# Patient Record
Sex: MALE | Race: WHITE
[De-identification: names, ages, dates, MRNs, and addresses within clinical notes are randomized per-mention and may not be internally consistent; named-entity substitution may affect disease eponyms.]

---

## 2020-02-11 ENCOUNTER — HOSPITAL ENCOUNTER (EMERGENCY)
Dept: HOSPITAL 25 - ED | Age: 60
Discharge: HOME | End: 2020-02-11
Payer: COMMERCIAL

## 2020-02-11 VITALS — SYSTOLIC BLOOD PRESSURE: 139 MMHG | DIASTOLIC BLOOD PRESSURE: 85 MMHG

## 2020-02-11 DIAGNOSIS — R00.1: ICD-10-CM
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
## 2020-02-11 DIAGNOSIS — R55: ICD-10-CM

## 2020-02-11 DIAGNOSIS — S01.01XA: Primary | ICD-10-CM
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
## 2020-02-11 DIAGNOSIS — E11.9: ICD-10-CM
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
## 2020-02-11 DIAGNOSIS — I10: ICD-10-CM

## 2020-02-11 LAB
ANION GAP SERPL CALC-SCNC: 7 MMOL/L (ref 2–11)
BASOPHILS # BLD AUTO: 0.1 10^3/UL (ref 0–0.2)
BUN SERPL-MCNC: 22 MG/DL (ref 6–24)
BUN/CREAT SERPL: 22.4 (ref 8–20)
CALCIUM SERPL-MCNC: 9.4 MG/DL (ref 8.6–10.3)
CHLORIDE SERPL-SCNC: 103 MMOL/L (ref 101–111)
EOSINOPHIL # BLD AUTO: 0.5 10^3/UL (ref 0–0.6)
GLUCOSE SERPL-MCNC: 163 MG/DL (ref 70–100)
HCO3 SERPL-SCNC: 28 MMOL/L (ref 22–32)
HCT VFR BLD AUTO: 41 % (ref 42–52)
HGB BLD-MCNC: 14.3 G/DL (ref 14–18)
LYMPHOCYTES # BLD AUTO: 1.4 10^3/UL (ref 1–4.8)
MCH RBC QN AUTO: 32 PG (ref 27–31)
MCHC RBC AUTO-ENTMCNC: 34 G/DL (ref 31–36)
MCV RBC AUTO: 93 FL (ref 80–94)
MONOCYTES # BLD AUTO: 0.6 10^3/UL (ref 0–0.8)
NEUTROPHILS # BLD AUTO: 4.5 10^3/UL (ref 1.5–7.7)
NRBC # BLD AUTO: 0 10^3/UL
NRBC BLD QL AUTO: 0
PLATELET # BLD AUTO: 225 10^3/UL (ref 150–450)
POTASSIUM SERPL-SCNC: 4 MMOL/L (ref 3.5–5)
RBC # BLD AUTO: 4.46 10^6 /UL (ref 4.18–5.48)
SODIUM SERPL-SCNC: 138 MMOL/L (ref 135–145)
TROPONIN I SERPL-MCNC: 0.01 NG/ML (ref ?–0.03)
WBC # BLD AUTO: 7 10^3/UL (ref 3.5–10.8)

## 2020-02-11 PROCEDURE — 85025 COMPLETE CBC W/AUTO DIFF WBC: CPT

## 2020-02-11 PROCEDURE — 83880 ASSAY OF NATRIURETIC PEPTIDE: CPT

## 2020-02-11 PROCEDURE — 70450 CT HEAD/BRAIN W/O DYE: CPT

## 2020-02-11 PROCEDURE — 36415 COLL VENOUS BLD VENIPUNCTURE: CPT

## 2020-02-11 PROCEDURE — 99283 EMERGENCY DEPT VISIT LOW MDM: CPT

## 2020-02-11 PROCEDURE — 80048 BASIC METABOLIC PNL TOTAL CA: CPT

## 2020-02-11 PROCEDURE — 84484 ASSAY OF TROPONIN QUANT: CPT

## 2020-02-11 PROCEDURE — 93005 ELECTROCARDIOGRAM TRACING: CPT

## 2020-02-11 NOTE — XMS REPORT
Continuity of Care Document (CCD)

 Created on:2020



Patient:Christiano Blanton

Sex:Male

:1960

External Reference #:[id number]





Demographics







 Address  [address] [place] [address]

 

 Home Phone  [phone]

 

 Mobile Phone  [phone]

 

 Work Phone  [phone]

 

 Phone  [phone]

 

 Email Address  [email or web address]

 

 Preferred Language  en

 

 Marital Status  Declined to Specify/Unknown

 

 [employer] Affiliation  Unknown

 

 Race  White

 

 Ethnic Group  Declined to Specify/Unknown









Author







 Name  Susan Linder MD

 

 Address  [address] [place] [id number]









Care Team Providers







 Name  Role  Phone

 

 [facility] -  Care Team Information   [phone]



 Gastroenterology    









Problems







 Active Problems  Provider  Date

 

 Hyperlipidemia  Zechariah Soto M.D.  Onset: 2003

 

 Prolonged depressive adjustment reaction  Zechariah Soto M.D.  Onset: 

 

 Type II diabetes mellitus uncontrolled  Zechariah Soto M.D.  Onset: 2003

 

 Benign essential hypertension  Zechariah Soto M.D.  Onset: 2004

 

 Induratio penis plastica  Zechariah Soto M.D.  Onset: 12/15/2008

 

 Psychosexual dysfunction associated with  Zechariah Soto M.D.  Onset: 12/
15/2008



 inhibited libido    

 

 Essential hypertension  BELLO Lua  Onset: 06/15/2011

 

 Otalgia  Domingo Coleman D.O.  Onset: 10/13/2014

 

 Type 2 diabetes mellitus  Domingo Coleman D.O.  Onset: 2015

 

 Essential tremor  Brian Blake M.D.  Onset: 2016

 

 Diaphragmatic hernia  BELLO Lua  Onset: 2018







Social History







 Type  Date  Description  Comments

 

 Birth Sex    Unknown  

 

 Tobacco Use  Start: Unknown End: Unknown  Does Not Smoke Cigarettes  

 

 Smoking Status  Reviewed: 20  Does Not Smoke Cigarettes  

 

 ETOH Use    Denies alcohol use  

 

 Tobacco Use  Start: Unknown  Patient has never smoked  







Allergies, Adverse Reactions, Alerts







 Description

 

 No Known Drug Allergies







Medications







 Active Medications  SIG  Qnty  Indications  Ordering  Date



         Provider  

 

 Trulicity  1.5 mg per week  6ml  E11.65  Brian Blake,  2019



   for management of      M.D.  



 1.5mg/0.5ML  diabetes        



 Solution Pen-Inject          



           

 

 Pentips Pen Needle  Inject Two Times A  100units    Domingo Coleman,  2019



 8mm  Day      D.O.  

 

 Glipizide ER  Take One Tablet By  90tabs  E11.9  Brian Blake,  2018



              10mg  Mouth Every Day In      M.D.  



 Tablets ER 24HR  The Morning For        



   Blood Sugar        



   Control        









 E11.65









 Atenolol  Take One Tablet By  90tabs  I10  Brian Blake,  2018



       25mg Tablets  Mouth Every Day      M.D.  



           

 

 Livongo Glucometer        Unknown  2018

 

 Calcium (OTC)        Unknown  2017

 

 Levemir Flextouch  Inject 25 Units  90units  E11.65  Domingo Coleman,  04/10/
2017



   Twice A Day as The      D.O.  



 100Unit/ML Solution  Same Time Every        



 Pen-Inject  Day, For Blood        



   Sugar Control,        



   Adjust as        



   Directed.        









 E11.9









 Atorvastatin Calcium  Take One Tablet By  90tabs  E78.5  Brian Blake,  



                   20mg  Mouth Every Day For      M.D.  



 Tablets  High Cholesterol        



           









 E11.9

 

 E11.65









 Lisinopril-Hydrochlorothiazide  Take Two  180tabs  I10  Hayden,  2015



                20-12.5mg Tablets  Tablets By      ALAYNA Torres  



   Mouth Every        



   Day For        



   Blood        



   Pressure        

 

 Aspir-81                     81mg  take one tab  otc    Unknown  2014



 Tablets DR  daily to        



   reduce risk        



   heart        



   attck/stroke        



   .        

 

 Vitamin C    OTC    Unknown  2014

 

 Iron    OTC    Unknown  2014

 

 pc Super Thin 30G Lancets  Use as  200units  E11Ashley Blake,  2012



   Directed      ALAYNA Torres  









 E11.65









 Multivitamins  1 po qd  OTC    Unknown  2011



           Tablets          



           

 

 Lancet Device For    1units    Brian Blake,  2011



 Testing Glucose        M.D.  

 

 Metformin HCL  Take One Tablet By  90tabs  E11.9  Domingo Coleman,  02/15/2006



          850mg Tablets  Mouth Every Evening      D.O.  



   For Diabetes        









 History Medications









 Trulicity  0.75 mg/0.5ml once  6ml  E11.65  MinisterioDomingo evans,  2019 -



         0.75mg/0.5ML  weekly x 4 weeks,      D.O.  2019



 Solution Pen-Inject  if tolerated.        



           

 

 Trulicity  1.5 mg per week for  2ml  E11.65  Gonzalohunter Brian,  2019 -



         1.5mg/0.5ML  management of      M.D.  2019



 Solution Pen-Inject  diabetes, start in        



   one month after        



   lower dose        



   Trulicity complete        







Medications Administered in Office







 Medication  SIG  Qnty  Indications  Ordering Provider  Date

 

 H1N1 Swine Flu Vaccine        Nurse's Schedule  2009



         Injection          







Immunizations







 CPT Code  Status  Date  Vaccine  Lot #

 

 U-Flu  Given  10/13/2019  Influenza,Unspecified  

 

 U-Flu  Given  2018  Influenza,Unspecified  

 

 U-Flu  Given  10/09/2017  Influenza,Unspecified  

 

 U-Flu  Given  10/09/2015  Influenza,Unspecified  

 

 15898  Given  2013  Zostavax  

 

 85884  Given  2012  Flu, Split Virus 3Yrs  [id number]

 

 83645  Given  2011  Flu, Split Virus 3Yrs  [id number]

 

 53863  Given  2010  Adacel or Boostrix, TDaP  

 

 47931  Given  2010  Flu, Split Virus 3Yrs  

 

 00343  Given  2010  Pneumococcal Immunization  [id number]

 

 13340  Given  2009  Flu, Split Virus 3Yrs  

 

 97853  Given  10/05/2007  Td Immunization  [id number]

 

 69617  Given  2005  Flu, Split Virus 3Yrs  

 

 16469  Given  2005  Flu, Split Virus 3Yrs  [id number]

 

 45599  Given  10/06/2004  Flu, Split Virus 3Yrs  

 

 67546  Given  10/02/2003  Flu, Split Virus 3Yrs  

 

 49488  Given  10/01/2002  Flu, Split Virus 3Yrs  

 

 58877  Given  10/01/2002  Hib,HbOC,4 Dose Schedule  

 

 58459  Given  2001  Flu, Split Virus 3Yrs  

 

 62888  Given  2000  Flu, Split Virus 3Yrs  

 

 81811  Given  2000  Hib,HbOC,4 Dose Schedule  

 

 15723  Given  10/01/1999  Flu, Split Virus 3Yrs  

 

 67164  Given  1997  Td Immunization  

 

 11097  Given  1995  Td Immunization  

 

 27618  Given  1972  Td Immunization  









 









 01461  Refused  2010  Adacel or Boostrix, TDaP  







Vital Signs







 Date  Vital  Result  Comment

 

 2020 12:58pm  BP Systolic  112 mmHg  









 BP Diastolic  68 mmHg  

 

 Height  72.25 inches  6'0.25"

 

 Weight  216.00 lb  

 

 BMI (Body Mass Index)  29.1 kg/m2  









 2019  3:57pm  BP Systolic  132 mmHg  









 BP Diastolic  68 mmHg  

 

 Weight  216.00 lb  







Results







 Test  Acquired Date  Facility  Test  Result  H/L  Range  Note

 

 Lipid Profile  2019  [facility]  Triglycerides  132 mg/dL      1



 (Trig/Chol/HDL)    [phone]          









 Cholesterol  157 mg/dL      2

 

 HDL Cholesterol  46.1 mg/dL      3

 

 LDL Cholesterol  85 mg/dL      4









 Comp Metabolic Panel  2019  [facility]  Sodium  136 mmol/L  Normal  
135-145  



     [phone]          









 Potassium  4.2 mmol/L  Normal  3.5-5.0  

 

 Chloride  101 mmol/L  Normal  101-111  

 

 Co2 Carbon Dioxide  27 mmol/L  Normal  22-32  

 

 Anion Gap  8 mmol/L  Normal  2-11  

 

 Glucose  179 mg/dL  High    

 

 Blood Urea Nitrogen  17 mg/dL  Normal  6-24  

 

 Creatinine  0.85 mg/dL  Normal  0.67-1.17  

 

 BUN/Creatinine Ratio  20.0  Normal  8-20  

 

 Calcium  9.4 mg/dL  Normal  8.6-10.3  

 

 Total Protein  6.8 g/dL  Normal  6.4-8.9  

 

 Albumin  4.4 g/dL  Normal  3.2-5.2  

 

 Globulin  2.4 g/dL  Normal  2-4  

 

 Albumin/Globulin Ratio  1.8  Normal  1-3  

 

 Total Bilirubin  0.80 mg/dL  Normal  0.2-1.0  

 

 Alkaline Phosphatase  50 U/L  Normal    

 

 Alt  42 U/L  Normal  7-52  

 

 Ast  26 U/L  Normal  13-39  

 

 Egfr Non-  92.9    >60  

 

 Egfr   112.4    >60  5









 Urine Microalbumin  2019  [facility]  Ur Microalbumin  18.7 mg/L    
  



 Random    [phone]  (mg/L)        









 Urine Creatinine  124.67 mg/dL      

 

 Urine Microalbumin/Creatinine  14.9  Normal  <31  









 Laboratory test finding  2019  [facility] House  Hemoglobin A1c  6.8%      

 

 Laboratory test finding  2019  In House  Hemoglobin A1c  7.5      









 1  Desirable: <150



   Borderline High: 150-199



   High: 200-499



   Very High: >500

 

 2  Desirable: <200



   Borderline High: 200-239



   High: >239

 

 3  Low: <40



   Desirable: 40-60



   High: >60

 

 4  Desirable: <100



   Near Optimal: 100-129



   Borderline High: 130-159



   High: 160-189



   Very High: >189

 

 5  *******Because ethnic data is not always readily available,



   this report includes an eGFR for both -Americans and



   non- Americans.****



   The National Kidney Disease Education Program (NKDEP) does



   not endorse the use of the MDRD equation for patients that



   are not between the ages of 18 and 70, are pregnant, have



   extremes of body size, muscle mass, or nutritional status,



   or are non- or non-.



   According to the National Kidney Foundation, irrespective of



   diagnosis, the stage of the disease is based on the level of



   kidney function:



   Stage Description                      GFR(mL/min/1.73 m(2))



   1     Kidney damage with normal or decreased GFR       90



   2     Kidney damage with mild decrease in GFR          60-89



   3     Moderate decrease in GFR                         30-59



   4     Severe decrease in GFR                           15-29



   5     Kidney failure                       <15 (or dialysis)







Procedures







 Date  Code  Description  Status

 

 2019  514132603  Diabetic Retinal Eye Exam  Completed

 

 2019  050799622  Diabetic Foot Exam  Completed

 

 2012  76716016  Colonoscopy  Completed







Medical Devices







 Description

 

 No Information Available







Encounters







 Type  Date  Location  Provider  Dx  Diagnosis

 

 Office Visit  2020  Main Office  Susan Linder,  Z01.818  Encounter 
for other



   12:55p    MD    preprocedural



           examination









 Z68.29  Body mass index (BMI) 29.0-29.9, adult









 Office Visit  2019  3:40p  Main Office  Teresa Aguilar,  E11.65  Type 2 
diabetes



       P.A.    mellitus with



           hyperglycemia









 E78.5  Hyperlipidemia, unspecified

 

 I10  Essential (primary) hypertension

 

 Z79.4  Long term (current) use of insulin

 

 Z79.899  Other long term (current) drug therapy









 Office Visit  2019  2:00p  Main Office  Teresa [place],  E11.65  Type 2 
diabetes



       P.A.    mellitus with



           hyperglycemia









 E78.5  Hyperlipidemia, unspecified

 

 I10  Essential (primary) hypertension

 

 F45.8  Other somatoform disorders

 

 N48.6  Induration penis plastica

 

 Z00.00  Encntr for general adult medical exam w/o abnormal findings

 

 E66.3  Overweight

 

 Z68.30  Body mass index (BMI) 30.0-30.9, adult







Assessments







 Date  Code  Description  Provider

 

 2020  Z01.818  Encounter for other preprocedural examination  Susan Linder MD

 

 2020  Z68.29  Body mass index (BMI) 29.0-29.9, adult  Susan Linder MD

 

 2019  E11.65  Type 2 diabetes mellitus with hyperglycemia  Teresa [place]
, P.A.

 

 2019  E78.5  Hyperlipidemia, unspecified  Teresa [place], P.A.

 

 2019  I10  Essential (primary) hypertension  Teresa [place], P.A.

 

 2019  Z79.4  Long term (current) use of insulin  Teresa [place], P.A.

 

 2019  Z79.899  Other long term (current) drug therapy  Teresa [place], 
P.A.

 

 2019  E11.65  Type 2 diabetes mellitus with hyperglycemia  Teresa [place]
, P.A.

 

 2019  E78.5  Hyperlipidemia, unspecified  Teresa [place], P.A.

 

 2019  I10  Essential (primary) hypertension  Teresa [place], P.A.

 

 2019  F45.8  Other somatoform disorders  Teresa [place], P.A.

 

 2019  N48.6  Induration penis plastica  Teresa [place], P.A.

 

 2019  Z00.00  Encounter for general adult medical  Teresa [place], P.A.



     examination without abnormal findings  

 

 2019  E66.3  Overweight  Teresa [place], P.A.

 

 2019  Z68.30  Body mass index (BMI) 30.0-30.9, adult  Teresa Ramseyle, P.A.







Plan of Treatment

Future Appointment(s):2020  3:45 pm - Susan Linder MD at [id number]  3:40 pm - BELLO Lua at [id number] - BELLO LuaE11.65 Type 2 diabetes mellitus with hyperglycemiaNew Medication:
Trulicity 1.5 mg/0.5ML - 1.5 mg per week for management of diabetesComments:
reminded after appt , via portal, of labs dueFollow up:A1C 6.8% today!  Great!
E78.5 Hyperlipidemia, [id number] Essential (primary) hypertensionComments:
pt advised to report cp, change in angina , sob  etContinue same meds with no 
change.  Comply with diet and exercise.  Lose weight and watch salt in 
diet.Z79.4 Long term (current) use of ihjljmzV76.899 Other long term (current) 
drug therapy



Functional Status







 Description

 

 No Information Available







Mental Status







 Description

 

 No Information Available







Referrals







 Description

 

 No Information Available

## 2020-02-11 NOTE — ED
Syncope/Near Syncope





- HPI Summary


HPI Summary: 





Patient is a 60 y/o M presenting to the ED via EMS for a chief complaint of 

syncopal episode that occurred on 02/11/20 while at work. Patient reports that 

he was walking when he hit his left LE on something and had a syncopal episode, 

falling back and hitting the back of his head. The syncopal episode was 

witnessed by a coworker. On EMS arrival, patient had decreased responsiveness 

for 10-15 seconds before resolving. Currently, patient complains of a headache 

that he rates as a 5/10 in severity and a laceration to the back of the head. 

Patient denies changes in appetite, chest pain, shortness of breath, vision 

changes, numbness, or paresthesia. On the night of 02/10/20, patient admits not 

sleeping well. In the past, patient has had similar symptoms when experiencing 

low blood sugar. PMHx is significant for DM, HLD, and HTN. Last tetanus 

vaccination was 7-8 years ago. Patient admits alcohol use, but denies tobacco 

or drug use. Medications reviewed. Allergies noted. 





- History Of Current Complaint


Hx Obtained From: Patient


Onset/Duration: Sudden Onset, Resolved


Timing: Constant


Context: Witnessed


Activity At Onset: At Rest


Associated Head Trauma: Yes


Aggravating Factor(s): Nothing


Alleviating Factor(s): Spontaneous Resolution


Associated Signs And Symptoms: Headache





- Allergies/Home Medications


Allergies/Adverse Reactions: 


 Allergies











Allergy/AdvReac Type Severity Reaction Status Date / Time


 


No Known Allergies Allergy   Verified 02/15/16 18:24














PMH/Surg Hx/FS Hx/Imm Hx


Previously Healthy: Yes


Endocrine/Hematology History: Reports: Hx Diabetes


Cardiovascular History: Reports: Hx Hypercholesterolemia, Hx Hypertension


Sensory History: 


   Denies: Hx Legally Blind, Hx Deafness


Opthamlomology History: 


   Denies: Hx Legally Blind


EENT History: 


   Denies: Hx Deafness





- Surgical History


Surgical History: None


Surgery Procedure, Year, and Place: None


Infectious Disease History: No





- Family History


Known Family History: 


   Negative: Blood Disorder





- Social History


Occupation: Employed Full-time


Lives: With Family


Alcohol Use: Occasionally


Hx Substance Use: No


Substance Use Type: Reports: None


Hx Tobacco Use: No


Smoking Status (MU): Never Smoked Tobacco





Review of Systems


Negative: Other - Negative changes in appetite


Negative: Other - Negative vision changes


Negative: Chest Pain


Negative: Shortness Of Breath


Positive: Other - Positive laceration to the back of the head


Neurological/Mental Status: Other - Positive decreased responsiveness, resolved


Positive: Headache, Syncope.  Negative: Paresthesia, Numbness


All Other Systems Reviewed And Are Negative: Yes





Physical Exam





- Summary


Physical Exam Summary: 





Constitutional: Well-developed, Well-nourished, Alert. (-) Distressed


Skin: Warm, Dry


HENT: Normocephalic; Atraumatic. 4 cm laceration to the occiput with no active 

bleeding.


Eyes: Conjunctiva normal


Neck: Musculoskeletal ROM normal neck. (-) JVD, (-) Stridor, (-) Tracheal 

deviation


Cardio: Rhythm regular, rate normal, Heart sounds normal; Intact distal pulses; 

Radial pulses are 2+ and symmetric. (-) Murmur


Pulmonary/Chest wall: Effort normal. (-) Respiratory distress, (-) Wheezes, (-) 

Rales


Abd: Soft, (-) tenderness, (-) Distension, (-) Guarding, (-) Rebound


Musculoskeletal: (-) Edema. 


Lymph: (-) Cervical adenopathy


Neuro: Alert, Oriented x3. NIH Stroke Scale: 0. 


Psych: Mood and affect Normal


Triage Information Reviewed: Yes


Vital Signs Reviewed: Yes





- [place] Coma Scale


Best Eye Response: 4 - Spontaneous


Best Motor Response: 6 - Obeys Commands


Best Verbal Response: 5 - Oriented


Coma Scale Total: 15





Procedures





- Sedation


Patient Received Moderate/Deep Sedation with Procedure: No





- Laceration/Wound Repair


  ** 1


Location: head - Occiput


Description: Linear


Anesthesia: Local


Length, Depth and Shape: 4 cm laceration to the occiput


Laceration/Wound Explored: clean


Closure: Staples #__ - 6





Diagnostics





- Laboratory


Result Diagrams: 


 02/11/20 08:41





 02/11/20 08:41


Lab Statement: Any lab studies that have been ordered have been reviewed, and 

results considered in the medical decision making process.





- CT


  ** Brain CT


CT Interpretation Completed By: Radiologist


Summary of CT Findings: Brain CT IMPRESSION:  1.  Posterior scalp laceration. 

The subjacent calvarium is intact.  2.  No acute intracranial abnormality.  

Reviewed by Dr. Shahid.





- EKG


  ** 08:47


Cardiac Rate: NL, Bradycardia - 57 BPM


EKG Rhythm: Sinus Bradycardia


ST Segment: Normal


Ectopy: None


Summary of EKG Findings: EKG at 08:47 shows sinus bradycardia with 57 BPM, T 

wave inversions in lead II and aVF, no prior EKG is available for comparison, 

no STEMI.  Reviewed and interpreted by Dr. Shahid.





Course/Dx


Course Of Treatment: Patient is here after having a syncopal episode at work.  

Patient did hit his head so a CT scan was ordered which was negative.  Patient'

s never had a syncopal episode like this before and had no preceding symptoms.  

Patient had bloodwork performed which showed no evidence of anemia, electrolyte 

abnormality, elevated troponin, elevated BNP.  Patient's EKG showed no evidence 

of ischemia or arrhythmia.  Patient was discharged with PCP follow-up as his 

overall low risk for syncope.  Patient did have his laceration repaired with 

staples.





- Diagnoses


Provider Diagnoses: 


 Occipital scalp laceration, Syncope








Discharge ED





- Sign-Out/Discharge


Documenting (check all that apply): Patient Departure - Discharge





- Discharge Plan


Condition: Stable


Disposition: HOME


Patient Education Materials:  Syncope (ED)


Referrals: 


Brian Blake MD [Primary Care Provider] - 


Additional Instructions: 


PLEASE RETURN TO EMERGENCY DEPARTMENT FOR SLURRED SPEECH, ONE-SIDED WEAKNESS, 

ONE-SIDED NUMBNESS, PUS FROM YOUR WOUND, REDNESS FROM YOUR WOUND, FEVER, OR ANY 

NEW OR WORSENING SYMPTOMS. Please follow up with your primary care physician. 

Please make all follow-ups in 1-3 days unless I advise you otherwise. Take 

Motrin 3 tablets every 6 hours for pain. You are likely to feel worse tomorrow. 

Return here, go to urgent care, or go to your primary in 10 days to have your 

staples removed. 





- Billing Disposition and Condition


Condition: STABLE


Disposition: Home





- Attestation Statements


Document Initiated by Caleb: Yes


Documenting Scribe: Argelia Galindo


Provider For Whom Caleb is Documenting (Include Credential): Reece Shahid MD


Scribe Attestation: 


Argelia PRATT, scribed for Reece Shahid MD on 02/11/20 at 1807. 


Scribe Documentation Reviewed: Yes


Provider Attestation: 


The documentation as recorded by the Argelia pace accurately reflects 

the service I personally performed and the decisions made by me, Reece Shaihd MD


Status of Scribe Document: Viewed





NIH Scale





- NIH Scale


Level of Consciousness: Alert/Keenly Responsive


Ask Patient the Month and His/Her Age: Both Correct


Ask Pt to Open/Close Eyes and /Release Non-Paretic Hand: Both Correctly


Best Gaze (Only Horizontal Eye Movement): Normal


Visual Field Testing: No Visual Loss


Facial Paresis-Pt to Smile & Close Eyes or Grimace Symmetry: Normal/Symmetrical


Motor Function - Right Arm: No Drift-Holds 10 Seconds


Motor Function - Left Arm: No Drift-Holds 10 Seconds


Motor Function - Right Leg: No Drift-Holds 10 Seconds


Motor Function - Left Leg: No Drift-Holds 10 Seconds


Limb Ataxia-Must be out of Proportion to Weakness Present: Absent


Sensory (Use Pinprick to Test Arms/Legs/Trunk/Face): Normal


Best Language (Describe Picture, Name Items): No Aphasia


Dysarthria (Read Several Words): Normal


Extinction and Inattention: No Abnormality


Total Score: 0
electronic